# Patient Record
Sex: MALE | Race: WHITE | NOT HISPANIC OR LATINO | Employment: OTHER | URBAN - METROPOLITAN AREA
[De-identification: names, ages, dates, MRNs, and addresses within clinical notes are randomized per-mention and may not be internally consistent; named-entity substitution may affect disease eponyms.]

---

## 2018-11-06 ENCOUNTER — TELEPHONE (OUTPATIENT)
Dept: UROLOGY | Facility: CLINIC | Age: 68
End: 2018-11-06

## 2018-11-06 NOTE — TELEPHONE ENCOUNTER
----- Message from Darryl Diaz sent at 11/6/2018  8:52 AM CST -----  Contact: pt: [phone]  Needs Advice    Reason for call: pt would like to speak with someone re penile implant, pt stated his implant isn't pumping up        Communication Preference: pt: [phone]

## 2018-11-06 NOTE — TELEPHONE ENCOUNTER
Spoke to pt. Pt reports problems with penile implant, malfunction. Instructed pt he needs appt. appt scheduled. Pt confirmed date/time/location.

## 2018-11-14 ENCOUNTER — HOSPITAL ENCOUNTER (OUTPATIENT)
Dept: RADIOLOGY | Facility: HOSPITAL | Age: 68
Discharge: HOME | End: 2018-11-14
Attending: UROLOGY
Payer: MEDICARE

## 2018-11-14 ENCOUNTER — OFFICE VISIT (OUTPATIENT)
Dept: UROLOGY | Facility: CLINIC | Age: 68
End: 2018-11-14
Payer: MEDICARE

## 2018-11-14 VITALS
DIASTOLIC BLOOD PRESSURE: 97 MMHG | BODY MASS INDEX: 31.05 KG/M2 | HEIGHT: 72 IN | SYSTOLIC BLOOD PRESSURE: 162 MMHG | HEART RATE: 81 BPM | WEIGHT: 229.25 LBS

## 2018-11-14 DIAGNOSIS — T83.490A MALFUNCTION OF PENILE PROSTHESIS, INITIAL ENCOUNTER: ICD-10-CM

## 2018-11-14 DIAGNOSIS — I10 HYPERTENSION, UNSPECIFIED TYPE: ICD-10-CM

## 2018-11-14 DIAGNOSIS — C61 PROSTATE CANCER: ICD-10-CM

## 2018-11-14 DIAGNOSIS — E78.00 HIGH CHOLESTEROL: ICD-10-CM

## 2018-11-14 DIAGNOSIS — N52.31 ERECTILE DYSFUNCTION AFTER RADICAL PROSTATECTOMY: Primary | ICD-10-CM

## 2018-11-14 PROCEDURE — 72192 CT PELVIS W/O DYE: CPT | Mod: TC

## 2018-11-14 PROCEDURE — 72192 CT PELVIS W/O DYE: CPT | Mod: 26,,, | Performed by: RADIOLOGY

## 2018-11-14 PROCEDURE — 99213 OFFICE O/P EST LOW 20 MIN: CPT | Mod: PBBFAC | Performed by: UROLOGY

## 2018-11-14 PROCEDURE — 99204 OFFICE O/P NEW MOD 45 MIN: CPT | Mod: S$GLB,,, | Performed by: UROLOGY

## 2018-11-14 PROCEDURE — 99999 PR PBB SHADOW E&M-EST. PATIENT-LVL III: CPT | Mod: PBBFAC,,, | Performed by: UROLOGY

## 2018-11-14 RX ORDER — TIZANIDINE 2 MG/1
TABLET ORAL
COMMUNITY
Start: 2018-11-06

## 2018-11-14 RX ORDER — ASPIRIN 325 MG
325 TABLET ORAL DAILY
COMMUNITY

## 2018-11-14 RX ORDER — LORATADINE 10 MG/1
10 TABLET ORAL DAILY
COMMUNITY

## 2018-11-14 NOTE — PROGRESS NOTES
CHIEF COMPLAINT:    Mr. Parmar is a 68 y.o. male presenting with ED.    PRESENTING ILLNESS:    Galileo Parmar is a 68 y.o. male male who c/o ED.  He is s/p a RALP by Dr. Valentine in 2009.  His cancer was found after a TURP.  He underwent placement of a 3 piece AMS IPP on 6/4/13.      He comes in today stating that he can't inflate the device anymore.  It was working well, but it stopped working ~ 1 weeks ago.    No bothersome LUTS.  No hematuria. No dysuria.    REVIEW OF SYSTEMS:    Galileo Parmar denies headache, blurred vision, fever, nausea, vomiting, chills, abdominal pain, bleeding per rectum, cough, SOB, recent loss of consciousness, recent mental status changes, seizures, dizziness, or upper or lower extremity weakness.    ALESHA  1. 1  2. 0  3. 0  4. 0  5. 0      PATIENT HISTORY:    Past Medical History:   Diagnosis Date    Allergy     Elevated PSA     Hypertension     Prostate cancer     Skin cancer     Urinary tract infection        Past Surgical History:   Procedure Laterality Date    CYSTOSCOPY      HERNIA REPAIR      double    INSERTION, PENILE PROSTHESIS, INFLATABLE N/A 6/4/2013    Performed by Soy Funez MD at [facility] OR [address] [place] [address]    PROSTATE SURGERY      2009 with turp x2    Robotic prostatectomy      SKIN CANCER EXCISION      TONSILLECTOMY, ADENOIDECTOMY      VASECTOMY         Family History   Problem Relation Age of Onset    Prostate cancer Father     Cancer Mother     Cancer Sister        Social History     Socioeconomic History    Marital status:      Spouse name: Not on file    Number of children: Not on file    Years of education: Not on file    Highest education level: Not on file   Social Needs    Financial resource strain: Not on file    Food insecurity - worry: Not on file    Food insecurity - inability: Not on file    Transportation needs - medical: Not on file    Transportation needs - non-medical: Not on file   Occupational History    Not on  file   Tobacco Use    Smoking status: Former Smoker     Packs/day: 1.00     Years: 10.00     Pack years: 10.00     Types: Cigarettes    Smokeless tobacco: Never Used   Substance and Sexual Activity    Alcohol use: Yes     Comment: 28 oz week    Drug use: No    Sexual activity: Yes     Partners: Female   Other Topics Concern    Not on file   Social History Narrative    Not on file       Allergies:  Sulfa (sulfonamide antibiotics)    Medications:    Current Outpatient Medications:     aspirin 325 MG tablet, Take 325 mg by mouth once daily., Disp: , Rfl:     buPROPion (WELLBUTRIN XL) 300 MG 24 hr tablet, , Disp: , Rfl:     desonide (DESOWEN) 0.05 % cream, , Disp: , Rfl:     ezetimibe (ZETIA) 10 mg tablet, Take 10 mg by mouth once daily., Disp: , Rfl:     lisinopril 10 MG tablet, , Disp: , Rfl:     loratadine (CLARITIN) 10 mg tablet, Take 10 mg by mouth once daily., Disp: , Rfl:     multivitamin capsule, Take 1 capsule by mouth once daily., Disp: , Rfl:     polyethylene glycol (GLYCOLAX) 17 gram PwPk, Take 17 g by mouth once daily., Disp: 30 packet, Rfl: 0    tiZANidine (ZANAFLEX) 2 MG tablet, , Disp: , Rfl:     trazodone (DESYREL) 150 MG tablet, nightly. , Disp: , Rfl:     PHYSICAL EXAMINATION:    The patient generally appears in good health, is appropriately interactive, and is in no apparent distress.     Eyes: anicteric sclerae, moist conjunctivae; no lid-lag; PERRLA     HENT: Atraumatic; oropharynx clear with moist mucous membranes and no mucosal ulcerations;normal hard and soft palate.  No evidence of lymphadenopathy.    Neck: Trachea midline.  No thyromegaly.    Musculoskeletal: No abnormal gait.    Skin: No lesions.    Mental: Cooperative with normal affect.  Is oriented to time, place, and person.    Neuro: Grossly intact.    Chest: Normal inspiratory effort.   No accessory muscles.  No audible wheezes.  Respirations symmetric on inspiration and expiration.    Heart: Regular rhythm.       Abdomen:  Soft, non-tender. No masses or organomegaly. Bladder is not palpable. No evidence of flank discomfort. No evidence of inguinal hernia.    Genitourinary: The penis is circumcised with no evidence of plaques or induration. The urethral meatus is normal. The testes, epididymides, and cord structures are normal in size and contour bilaterally. The scrotum is normal in size and contour.  The IPP components are palpable in the penis and scrotum.    Extremities: No clubbing, cyanosis, or edema      LABS:    UA dipped negative today  No results found for: PSA, PSADIAG, PSATOTAL, PSAFREE, PSAFREEPCT    IMPRESSION:    Encounter Diagnoses   Name Primary?    Erectile dysfunction after radical prostatectomy Yes    Prostate cancer     High cholesterol     Hypertension, unspecified type    HTN, controlled  Hyperlipidemia, controlled      PLAN:    1. Cannot inflate/deflate the device today.  It is c/w a leak.  Recommend replacement.  2. Will get a CT to define the anatomy of the reservoir.  Discussed we may not remove this.  3. Patient read the IPP handout and understands the risks associated with this procedure. He knows the risk of infection as well as surgery requirements if it were to become infected. He understands the impact on spontaneous and nocturnal erections, as well as need for replacement and repair, which was clearly outlined in verbal and written form. He was given the chance to ask questions and alternatives were discussed.  4. Discussed he's at higher risk of infection due to revision surgery.  5. Risks discussed were You have elected to undergo placement of a penile prosthesis. Several devices are currently available in the marketplace, including those which are non-inflatable, versus two- or three-piece inflatable prostheses. All of these devices have the capacity to give you the opportunity to have a rigid penis on demand and to be used as frequently and as long as you would like. There are,  therefore, many advantages to the use of the prosthesis which you have already discussed with your physician. The goal of this informed consent form is to identify the potential problems that have been recognized to occur with penile prosthesis placement and that you understand the risks of undergoing prosthetic placement. It is important to recognize that as a result of improvements in design as well as better surgical technique, that all of the risks listed below are less than they were even 10 years ago. It is also important to recognize that most of the available studies report on historical data for devices which are now either not manufactured or have undergone structural improvements to reduce those side effects. The complications are simply noted in random order and do not reflect their frequency.    1. Prosthesis mechanical failure occurs as a result of leakage of fluid from the inflatable types of devices. This typically occurs as a result of a fracture in the tubing, most commonly as it emerges out of the scrotal pump, but it can also be due to a leak from the erectile cylinders in the penis. It is felt that this occurs as a result of repetitive mechanical trauma, which weakens the tubing and causes it to crack. When the fluid leaks, it is not something you would be aware of. It does not cause pain. The fluid contained within the device is typically a sterile saline solution that will simply be reabsorbed by the body. What you will recognize is that when you squeeze the pump, there will be no transfer of fluid and no rigidity or inadequate rigidity. The most recent long-term data looking at 5-10 year success reports mechanical failure in the 5-10% range over that period of time. Looked at another way, 90-95% of men will have a functional prosthesis in 10 years. These devices are designed to be used for life. Each company has its own warrantee which you should discuss with your physician.    2. Infection  "is a disastrous complication which usually requires the removal of the prosthesis, as it is rare to be able to clear infection so long as the prosthesis is within the body. Occasionally, the infected prosthesis can be removed, the location of the device can be washed out vigorously with a special antibiotic salvage procedure and then a new device may be able to be immediately placed. When this is not possible, the infected device is removed and then a period of healing will follow where the device can be replaced after a period of healing (6 weeks to 6 months). Delayed replacement of a prosthesis after initial removal is a more complicated operation associated with the potential for not being able to replace the device because of scarring but other problems such as shortening of the penis or change in sensation and shape may also occur.    As a result of improved surgical technique and device design, infection rates are now markedly reduced, typically being reported in the <1-2% range for new prosthesis placements and up to 3% when the prosthesis is uninfected and has mechanically failed.    3. Bleeding and hematoma are rarely a problem. There is likely to be localized swelling as well as "black and blue" of the penis, groin area, and scrotal sack. These will resolve without treatment over 1-3 weeks. Rarely a scrotal hematoma (collection of blood) will develop which can be treated with rest; it will reabsorb with time or, if it is growing in size or painful, it may need to be evacuated surgically (opened up and washed out). The risk of needing a blood transfusion after penile prosthesis placement is extremely rare to nonexistent.    4. Post-operative pain is variable and can be minimal, but in most men it is quite significant. Your physician will provide you with adequate pain medication to help control the pain, but not necessarily eliminate it entirely. As the prosthesis heals, there will be complete resolution of " the pain, such that you will typically not even be aware that the prosthesis is within your body unless you were to touch it directly. Complete pain resolution will most commonly occur within 4-12 weeks postoperatively. Post-operative pain is typically managed with oral narcotic agents. You should be aware that these drugs can cause constipation as well as sleepiness; therefore, you should not be in any position where you might need to make important decision or driving until the pain is under better control without the need for narcotic pain killers. It is recommended that during the first several days after the operation, that you spend as little time as possible on your feet, as this will encourage healing, reduce swelling, and result in more rapid resolution of pain.    5. Loss of length -  Penile shortening is probably the reason that most men are disappointed with the outcome from their prosthesis surgery. Studies have shown that when the flaccid penile stretched length is measured preoperatively, that the actual loss of length following placement of the prosthesis is on average no more than one centimeter (1/3 inch). To reduce the likelihood of loss of length, the surgeon will do his best to place the proper size device that fits your penis. You can expect that the length of your penis will be much like what you see when you grab the head of the penis and pull it straight out away from your body. Many men who believe they have lost length in their penis following prosthesis surgery in fact did not really lose length because of the surgery, but rather because they have not had had a full erection for some time during which there may have been some loss of tissue elasticity and thereby shortening of the penis. In addition, they may have gained some weight in the pubic area which will cover the penis and make it look shorter. Lastly, they may be expecting that the postoperative result will be like the erections  they had when they were a much younger man. Although the goal is to make the penis as long as possible, one can expect that the rigidity of the penis will be much like the erections obtained as a younger man.    6. Decreased girth - Girth is typically not substantially changed as most cylinders can expand and fill the penis satisfactorily, but if there has been scarring within the tissues of the penis, this can prevent expansion and result in a narrower appearing penis. The surgeon will do his best to put the largest cylinder in the penis, but there are limitations to which the tissues can expand. Decreased girth is not a common complaint.    7. Sensory loss - By and large the surgical techniques being used to avoid injury to the sensory nerves of the penis. Therefore, there is rarely any significant change in the sexual sensation of the penis. Some men find that it takes longer for them to experience orgasm. This may occur because they can simply inflate the penis without any sexuall arousal, and then it will seem to take longer for them to become aroused, resulting in the prolonged time to orgasm. Therefore, proper sexual stimulation is important to enjoy the entire sexual experience with a prosthesis.    8. Change in shape - The overall shape or configuration of the penis is rarely altered as a result of placement of any type of prosthesis, but if there is some unidentified scarring involving the penis such as that which occurs with Peyronie's disease, some curvature or indentations including hourglass narrowing can be identified along the shaft. Typically, in the postoperative period, the prosthesis will cause an internal tissue expansion which will correct these deformities. This may take 6-9 months occur.    9. Erosion or cylinder extrusion - If the tissues in the tips of the penis are weakened either by previous internal penile disease or as a result of the surgery, the prosthetic cylinder may migrate  "distally into the head of the penis and may appear to be ready to poke through the skin or the urethra. By all means, if such an irregularity is seen, one should address it with your doctor before the prosthesis is exposed so that it can be corrected without developing infection. This problem occurs in <1% of cases.    10. Pump problems - These include difficulty in activating or deactivating the pump as well as change of pump location due to migration or fixation of the pump to the scrotal skin. These problems are also quite unusual but can happen as a result of an altered healing process or a malposition of the pump by the implanting surgeon. If the pump were to migrate or become fixed or difficult to manipulate because of its position, a simple outpatient scrotal procedure can be performed to reposition the pump which is almost universally successful. This procedure is performed in no more than 1% of cases.    Prosthesis care - In the postoperative period, it is best to take the antibiotics prescribed by your physician, reduce your physical activity to reduce swelling and enhance healing, take pain medicine for your comfort, and avoid submergingthe incision during bathing for a minimum of 1-4 weeks. Specific bathing instructions will be issued by your physician. It is not uncommon to have an inflatable prosthesis partially fill during the postoperative period involuntarily. This is called "auto-inflation." To prevent this problem, it is important that once the prosthesis is activated, which is typically 4-6 weeks after surgery, that you perform what is known as "cycling" of the device. Cycling means complete inflation and then complete deflation of the penile cylinders twice per day for one month. In doing this, the tissues around the prosthesis are softened and stretched allowing complete deflation of the device into the reservoir. It also will allow you to become more familiar with operating the device and make " it easier for you to activate it quickly and inconspicuously when you want to engage in sexual relations. If you have an inflatable device with a scrotal pump, it is best to inflate it without twisting it. The repetitive twisting of the pump can weaken the connections between the tubing and the pump and is the most common cause for mechanical failure of the prosthesis.    It is hoped that this review will inform you of the potential problems associated with your prosthesis and as a result, will make for more realistic expectations regarding the outcome.     Copy to:

## 2018-12-03 ENCOUNTER — TELEPHONE (OUTPATIENT)
Dept: UROLOGY | Facility: CLINIC | Age: 68
End: 2018-12-03

## 2018-12-03 NOTE — TELEPHONE ENCOUNTER
Spoke to the pt to postpone his pre op and procedure  Because of the auth still pending as of 12-3-18. The patient did verbally understands.

## 2018-12-04 ENCOUNTER — TELEPHONE (OUTPATIENT)
Dept: UROLOGY | Facility: CLINIC | Age: 68
End: 2018-12-04

## 2018-12-04 ENCOUNTER — ANESTHESIA EVENT (OUTPATIENT)
Dept: SURGERY | Facility: HOSPITAL | Age: 68
End: 2018-12-04
Payer: MEDICARE

## 2018-12-04 DIAGNOSIS — N52.31 ERECTILE DYSFUNCTION AFTER RADICAL PROSTATECTOMY: Primary | ICD-10-CM

## 2018-12-04 DIAGNOSIS — Z01.818 PREOPERATIVE TESTING: Primary | ICD-10-CM

## 2018-12-04 NOTE — ANESTHESIA PREPROCEDURE EVALUATION
Odilia Gutierrez, RN   Registered Nurse      Pre Admission Screening   Signed                     Anesthesia Assessment: Preoperative EQUATION     Planned Procedure: Procedure(s) (LRB):  REMOVAL, PENILE PROSTHESIS, INFLATABLE (N/A)  REPLACEMENT, PENILE PROSTHESIS, INFLATABLE (N/A)  Requested Anesthesia Type:General  Surgeon: Soy Funez MD  Service: Urology  Known or anticipated Date of Surgery:12/11/2018     Surgeon notes: reviewed     Electronic QUestionnaire Assessment completed via nurse interview with patient.      NO AQ     Triage considerations:      The patient has no apparent active cardiac condition (No unstable coronary Syndrome such as severe unstable angina or recent [<1 month] myocardial infarction, decompensated CHF, severe valvular   disease or significant arrhythmia)     Previous anesthesia records:GETA and No problems   6/4/2013  Penile Prosthesis insertion     Airway/Jaw/Neck:  Airway Findings: Tongue: Normal Mallampati: I  TM Distance: <4 cm         Last PCP note: within 1 month , outside [facility]  Dr. Galileo Khan, [place]  Subspecialty notes: Neurology, Urology     Other important co-morbidities: Hx Prostate Ca, HTN, Limb-Girdle Muscular Dystrophy     Tests already available:  No recent tests. Requesting records from out of state MD's                            Instructions given. (See in Nurse's note)     Optimization:  Anesthesia Preop Clinic Assessment  Indicated: Not required for this procedure                Plan:    Testing:  Hematology Profile, BMP and EKG                          Patient  has previously scheduled Medical Appointment:  12/5/2018     Navigation:    Tests Scheduled.                          Results will be tracked by Preop Clinic.          12/6/2018  Lab and EKG results from Dr. Khan received.  Instructions for ASA to be held 1 week prior to surgery received from Dr. Pritchett.  (records scanned to media)                                                                                                                12/04/2018  Galileo Parmar is a 68 y.o., male.    Anesthesia Evaluation    I have reviewed the Patient Summary Reports.     I have reviewed the Medications.     Review of Systems  Anesthesia Hx:  No problems with previous Anesthesia Denies Hx of Anesthetic complications  History of prior surgery of interest to airway management or planning: Previous anesthesia: General 6/4/2013  Insertion of Penile prosthesis with general anesthesia.  Procedure performed at an [facility] Facility. Denies Family Hx of Anesthesia complications.   Denies Personal Hx of Anesthesia complications.   Social:  Former Smoker, Social Alcohol Use  Tobacco Use:  (quit over 40 yrs ago)   Hematology/Oncology:  Hematology Normal       -- Cancer in past history: s/p surgery   Oncology Comments: Hx of Prostate CA (Prostatectomy 2009)     EENT/Dental:  EENT/Dental Normal Denies Active Dental Problems  Denies Jaw Problems   Cardiovascular:   Hypertension Denies Dysrhythmias.   Denies Angina.  Functional Capacity good / => 4 METS, plays golf and is very active  Hypertension , Well Controlled on Rx , Recent typical home B/P of 130/80   Pulmonary:  Pulmonary Normal  Denies Asthma.  Denies Shortness of breath.  Denies Recent URI.    Renal/:  Other Renal / Gu Conditions: (Erectile dysfunction after radical prostatectomy)   Hepatic/GI:  Hepatic/GI Normal    Musculoskeletal:   Arthritis     Neurological:  Neurology Normal  Neuromuscular Disease, Muscular Dystrophy, Limb-Girdle Muscular Dystrophy   Psych:  Psychiatric Normal           Physical Exam  General:  Well nourished, Obesity    Airway/Jaw/Neck:  Airway Findings: Mouth Opening: Normal Tongue: Normal  General Airway Assessment: Adult  Mallampati: II  TM Distance: Normal, at least 6 cm  Jaw/Neck Findings:  Neck ROM: Normal ROM     Eyes/Ears/Nose:  EYES/EARS/NOSE FINDINGS: Normal   Dental:  Dental Findings: In tact, molar caps   Chest/Lungs:  Chest/Lungs  Findings: Normal Respiratory Rate     Heart/Vascular:  Heart Findings: Rate: Normal  Rhythm: Regular Rhythm     Abdomen:  Abdomen Findings: Normal    Musculoskeletal:  Musculoskeletal Findings: Normal   Skin:  Skin Findings: Normal    Mental Status:  Mental Status Findings:  Cooperative, Alert and Oriented         Anesthesia Plan  Type of Anesthesia, risks & benefits discussed:  Anesthesia Type:  general  Patient's Preference: General  Intra-op Monitoring Plan: standard ASA monitors  Intra-op Monitoring Plan Comments:   Post Op Pain Control Plan: per primary service following discharge from PACU and IV/PO Opioids PRN  Post Op Pain Control Plan Comments:   Induction:   IV  Beta Blocker:  Patient is not currently on a Beta-Blocker (No further documentation required).       Informed Consent: Patient understands risks and agrees with Anesthesia plan.  Questions answered. Anesthesia consent signed with patient.  ASA Score: 2     Day of Surgery Review of History & Physical:    H&P update referred to the surgeon.         Ready For Surgery From Anesthesia Perspective.

## 2018-12-04 NOTE — PRE ADMISSION SCREENING
Anesthesia Assessment: Preoperative EQUATION    Planned Procedure: Procedure(s) (LRB):  REMOVAL, PENILE PROSTHESIS, INFLATABLE (N/A)  REPLACEMENT, PENILE PROSTHESIS, INFLATABLE (N/A)  Requested Anesthesia Type:General  Surgeon: Soy Funez MD  Service: Urology  Known or anticipated Date of Surgery:12/11/2018    Surgeon notes: reviewed    Electronic QUestionnaire Assessment completed via nurse interview with patient.      NO AQ    Triage considerations:     The patient has no apparent active cardiac condition (No unstable coronary Syndrome such as severe unstable angina or recent [<1 month] myocardial infarction, decompensated CHF, severe valvular   disease or significant arrhythmia)    Previous anesthesia records:GETA and No problems   6/4/2013  Penile Prosthesis insertion    Airway/Jaw/Neck:  Airway Findings: Tongue: Normal Mallampati: I  TM Distance: <4 cm        Last PCP note: within 1 month , outside [facility]  Dr. Galileo Khan, [place]  Subspecialty notes: Neurology, Urology    Other important co-morbidities: Hx Prostate Ca, HTN, Limb-Girdle Muscular Dystrophy     Tests already available:  No recent tests. Requesting records from out of state MD's            Instructions given. (See in Nurse's note)    Optimization:  Anesthesia Preop Clinic Assessment  Indicated: Not required for this procedure        Plan:    Testing:  Hematology Profile, BMP and EKG    Patient  has previously scheduled Medical Appointment:  12/5/2018    Navigation:  Tests Scheduled.    Results will be tracked by Preop Clinic.

## 2018-12-05 ENCOUNTER — OFFICE VISIT (OUTPATIENT)
Dept: UROLOGY | Facility: CLINIC | Age: 68
End: 2018-12-05
Payer: MEDICARE

## 2018-12-05 ENCOUNTER — HOSPITAL ENCOUNTER (OUTPATIENT)
Dept: CARDIOLOGY | Facility: CLINIC | Age: 68
Discharge: HOME | End: 2018-12-05
Payer: MEDICARE

## 2018-12-05 DIAGNOSIS — N52.9 ERECTILE DYSFUNCTION, UNSPECIFIED ERECTILE DYSFUNCTION TYPE: Primary | ICD-10-CM

## 2018-12-05 DIAGNOSIS — Z01.818 PREOPERATIVE TESTING: ICD-10-CM

## 2018-12-05 PROCEDURE — 93010 ELECTROCARDIOGRAM REPORT: CPT | Mod: S$GLB,,, | Performed by: INTERNAL MEDICINE

## 2018-12-05 PROCEDURE — 99499 UNLISTED E&M SERVICE: CPT | Mod: S$GLB,,, | Performed by: UROLOGY

## 2018-12-05 PROCEDURE — 93005 ELECTROCARDIOGRAM TRACING: CPT | Mod: S$GLB,,, | Performed by: ANESTHESIOLOGY

## 2018-12-05 NOTE — PROGRESS NOTES
Urology ([facility]) H&P  Staff: Dr. Soy Funez MD    Is this patient in a research study?  Yes    CC: non functional IPP    HPI:  Galileo Parmar is a 68 y.o. male with a non functioning IPP.  It stopped working in the past few weeks.  Previously it worked well.  He had a prostatectomy with Dr. Valentine in 2009.   We do not have any PSA or pathology in our system.    He had post op ED treated with an IPP.  This was placed in 2013.  It was a 21 cm device with 1 cm rear tip extender bilaterally.  [place] is in RLQ.  He has also had bilateral inguinal hernia repairs.  Unknown if mesh was used.    He is mostly continent.   He does have small volume leakage but isn't bothered.  Doesn't wear pads.  Most recent PSA is undetectable per patient report.     Pinch strength = 10.   strength 70.     ROS:  Neg except per HPI    Past Medical History:   Diagnosis Date    Allergy     Elevated PSA     Hypertension     Prostate cancer     Skin cancer     Urinary tract infection        Past Surgical History:   Procedure Laterality Date    CYSTOSCOPY      HERNIA REPAIR      double    INSERTION, PENILE PROSTHESIS, INFLATABLE N/A 6/4/2013    Performed by Soy Funez MD at [facility] OR [address] [place] [address]    PROSTATE SURGERY      2009 with turp x2    Robotic prostatectomy      SKIN CANCER EXCISION      TONSILLECTOMY, ADENOIDECTOMY      VASECTOMY     Bilateral inguinal hernia repair, doesn't know if he had mesh placed    Social History     Socioeconomic History    Marital status:      Spouse name: Not on file    Number of children: Not on file    Years of education: Not on file    Highest education level: Not on file   Social Needs    Financial resource strain: Not on file    Food insecurity - worry: Not on file    Food insecurity - inability: Not on file    Transportation needs - medical: Not on file    Transportation needs - non-medical: Not on file   Occupational History    Not on file   Tobacco Use     Smoking status: Former Smoker     Packs/day: 1.00     Years: 10.00     Pack years: 10.00     Types: Cigarettes    Smokeless tobacco: Never Used   Substance and Sexual Activity    Alcohol use: Yes     Comment: 28 oz week    Drug use: No    Sexual activity: Yes     Partners: Female   Other Topics Concern    Not on file   Social History Narrative    Not on file       Family History   Problem Relation Age of Onset    Prostate cancer Father     Cancer Mother     Cancer Sister        Review of patient's allergies indicates:   Allergen Reactions    Sulfa (sulfonamide antibiotics) Rash       Current Outpatient Medications on File Prior to Visit   Medication Sig Dispense Refill    aspirin 325 MG tablet Take 325 mg by mouth once daily.      buPROPion (WELLBUTRIN XL) 300 MG 24 hr tablet       ezetimibe (ZETIA) 10 mg tablet Take 10 mg by mouth once daily.      lisinopril 10 MG tablet       loratadine (CLARITIN) 10 mg tablet Take 10 mg by mouth once daily.      multivitamin capsule Take 1 capsule by mouth once daily.      tiZANidine (ZANAFLEX) 2 MG tablet       trazodone (DESYREL) 150 MG tablet nightly.        No current facility-administered medications on file prior to visit.        Anticoagulation:  Yes,  mg will hold one week prior    Physical Exam:    AAOx4, NAD, WDWN  NC/AT, EOMI, PER, sclerae anicteric, speech normal, tongue midline  Nl effort, CTAB  RRR  Soft, non-tender, non-distended  Circumcised  Prosthesis components palpable and in normal position    Labs:    Urine dipstick today - neg for all components    Lab Results   Component Value Date    WBC 6.23 12/05/2018    HGB 14.5 12/05/2018    HCT 45.1 12/05/2018    MCV 94 12/05/2018     12/05/2018       BMP  Lab Results   Component Value Date     05/29/2013    K 4.3 05/29/2013     05/29/2013    CO2 25 05/29/2013    BUN 17 05/29/2013    CREATININE 0.8 05/29/2013    CALCIUM 9.4 05/29/2013    ANIONGAP 11.0 05/29/2013     ESTGFRAFRICA >60 05/29/2013    EGFRNONAA >60 05/29/2013       No results found for: PSA    Imaging:  CT reviewed: reservoir is in RLQ, other prosthesis components in appropriate position    Assessment: Galileo Parmar is a 68 y.o. male with a non functioning IPP    Plan:     1. To OR on 12/11/2018 for IPP explant with reimplant  2. Consents signed   3. I have explained the risk, benefits, and alternatives of the procedure in detail. The patient voices understanding and all questions have been answered. The patient agrees to proceed as planned.   4. The risks and benefits of participating in our clinical trial have been discussed and the patient has consented for the research study here at [facility].     Meredith Nova MD

## 2018-12-05 NOTE — H&P (VIEW-ONLY)
Urology ([facility]) H&P  Staff: Dr. Soy Funez MD    Is this patient in a research study?  Yes    CC: non functional IPP    HPI:  Galileo Parmar is a 68 y.o. male with a non functioning IPP.  It stopped working in the past few weeks.  Previously it worked well.  He had a prostatectomy with Dr. Valentine in 2009.   We do not have any PSA or pathology in our system.    He had post op ED treated with an IPP.  This was placed in 2013.  It was a 21 cm device with 1 cm rear tip extender bilaterally.  [place] is in RLQ.  He has also had bilateral inguinal hernia repairs.  Unknown if mesh was used.    He is mostly continent.   He does have small volume leakage but isn't bothered.  Doesn't wear pads.  Most recent PSA is undetectable per patient report.     Pinch strength = 10.   strength 70.     ROS:  Neg except per HPI    Past Medical History:   Diagnosis Date    Allergy     Elevated PSA     Hypertension     Prostate cancer     Skin cancer     Urinary tract infection        Past Surgical History:   Procedure Laterality Date    CYSTOSCOPY      HERNIA REPAIR      double    INSERTION, PENILE PROSTHESIS, INFLATABLE N/A 6/4/2013    Performed by Soy Funez MD at [facility] OR [address] [place] [address]    PROSTATE SURGERY      2009 with turp x2    Robotic prostatectomy      SKIN CANCER EXCISION      TONSILLECTOMY, ADENOIDECTOMY      VASECTOMY     Bilateral inguinal hernia repair, doesn't know if he had mesh placed    Social History     Socioeconomic History    Marital status:      Spouse name: Not on file    Number of children: Not on file    Years of education: Not on file    Highest education level: Not on file   Social Needs    Financial resource strain: Not on file    Food insecurity - worry: Not on file    Food insecurity - inability: Not on file    Transportation needs - medical: Not on file    Transportation needs - non-medical: Not on file   Occupational History    Not on file   Tobacco Use     Smoking status: Former Smoker     Packs/day: 1.00     Years: 10.00     Pack years: 10.00     Types: Cigarettes    Smokeless tobacco: Never Used   Substance and Sexual Activity    Alcohol use: Yes     Comment: 28 oz week    Drug use: No    Sexual activity: Yes     Partners: Female   Other Topics Concern    Not on file   Social History Narrative    Not on file       Family History   Problem Relation Age of Onset    Prostate cancer Father     Cancer Mother     Cancer Sister        Review of patient's allergies indicates:   Allergen Reactions    Sulfa (sulfonamide antibiotics) Rash       Current Outpatient Medications on File Prior to Visit   Medication Sig Dispense Refill    aspirin 325 MG tablet Take 325 mg by mouth once daily.      buPROPion (WELLBUTRIN XL) 300 MG 24 hr tablet       ezetimibe (ZETIA) 10 mg tablet Take 10 mg by mouth once daily.      lisinopril 10 MG tablet       loratadine (CLARITIN) 10 mg tablet Take 10 mg by mouth once daily.      multivitamin capsule Take 1 capsule by mouth once daily.      tiZANidine (ZANAFLEX) 2 MG tablet       trazodone (DESYREL) 150 MG tablet nightly.        No current facility-administered medications on file prior to visit.        Anticoagulation:  Yes,  mg will hold one week prior    Physical Exam:    AAOx4, NAD, WDWN  NC/AT, EOMI, PER, sclerae anicteric, speech normal, tongue midline  Nl effort, CTAB  RRR  Soft, non-tender, non-distended  Circumcised  Prosthesis components palpable and in normal position    Labs:    Urine dipstick today - neg for all components    Lab Results   Component Value Date    WBC 6.23 12/05/2018    HGB 14.5 12/05/2018    HCT 45.1 12/05/2018    MCV 94 12/05/2018     12/05/2018       BMP  Lab Results   Component Value Date     05/29/2013    K 4.3 05/29/2013     05/29/2013    CO2 25 05/29/2013    BUN 17 05/29/2013    CREATININE 0.8 05/29/2013    CALCIUM 9.4 05/29/2013    ANIONGAP 11.0 05/29/2013     ESTGFRAFRICA >60 05/29/2013    EGFRNONAA >60 05/29/2013       No results found for: PSA    Imaging:  CT reviewed: reservoir is in RLQ, other prosthesis components in appropriate position    Assessment: Galileo Parmar is a 68 y.o. male with a non functioning IPP    Plan:     1. To OR on 12/11/2018 for IPP explant with reimplant  2. Consents signed   3. I have explained the risk, benefits, and alternatives of the procedure in detail. The patient voices understanding and all questions have been answered. The patient agrees to proceed as planned.   4. The risks and benefits of participating in our clinical trial have been discussed and the patient has consented for the research study here at [facility].     Meredith Nova MD

## 2018-12-10 ENCOUNTER — TELEPHONE (OUTPATIENT)
Dept: UROLOGY | Facility: CLINIC | Age: 68
End: 2018-12-10

## 2018-12-10 NOTE — TELEPHONE ENCOUNTER
Called pt to confirm arrival time 830am for procedure. Gave pt NPO instructions and gave pt opportunity to ask questions. Pt verbalized understanding.

## 2018-12-11 ENCOUNTER — ANESTHESIA (OUTPATIENT)
Dept: SURGERY | Facility: HOSPITAL | Age: 68
End: 2018-12-11
Payer: MEDICARE

## 2018-12-11 ENCOUNTER — HOSPITAL ENCOUNTER (OUTPATIENT)
Facility: HOSPITAL | Age: 68
Discharge: HOME | End: 2018-12-12
Attending: UROLOGY | Admitting: UROLOGY
Payer: MEDICARE

## 2018-12-11 DIAGNOSIS — N52.9 ERECTILE DYSFUNCTION, UNSPECIFIED ERECTILE DYSFUNCTION TYPE: ICD-10-CM

## 2018-12-11 DIAGNOSIS — C61 PROSTATE CANCER: Primary | ICD-10-CM

## 2018-12-11 DIAGNOSIS — N52.31 ERECTILE DYSFUNCTION AFTER RADICAL PROSTATECTOMY: ICD-10-CM

## 2018-12-11 PROCEDURE — 25000003 PHARM REV CODE 250: Performed by: UROLOGY

## 2018-12-11 PROCEDURE — 94761 N-INVAS EAR/PLS OXIMETRY MLT: CPT

## 2018-12-11 PROCEDURE — 88300 SURGICAL PATH GROSS: CPT | Performed by: PATHOLOGY

## 2018-12-11 PROCEDURE — 63600175 PHARM REV CODE 636 W HCPCS: Performed by: UROLOGY

## 2018-12-11 PROCEDURE — 63600175 PHARM REV CODE 636 W HCPCS: Performed by: NURSE ANESTHETIST, CERTIFIED REGISTERED

## 2018-12-11 PROCEDURE — 71000033 HC RECOVERY, INTIAL HOUR: Performed by: UROLOGY

## 2018-12-11 PROCEDURE — 37000009 HC ANESTHESIA EA ADD 15 MINS: Performed by: UROLOGY

## 2018-12-11 PROCEDURE — 54411 REMOV/REPLC PENIS PROS COMP: CPT | Mod: ,,, | Performed by: UROLOGY

## 2018-12-11 PROCEDURE — D9220A PRA ANESTHESIA: Mod: CRNA,,, | Performed by: NURSE ANESTHETIST, CERTIFIED REGISTERED

## 2018-12-11 PROCEDURE — G0378 HOSPITAL OBSERVATION PER HR: HCPCS

## 2018-12-11 PROCEDURE — 36000706: Performed by: UROLOGY

## 2018-12-11 PROCEDURE — 63600175 PHARM REV CODE 636 W HCPCS

## 2018-12-11 PROCEDURE — 25000003 PHARM REV CODE 250: Performed by: NURSE ANESTHETIST, CERTIFIED REGISTERED

## 2018-12-11 PROCEDURE — D9220A PRA ANESTHESIA: Mod: ANES,,, | Performed by: ANESTHESIOLOGY

## 2018-12-11 PROCEDURE — 88300 SURGICAL PATH GROSS: CPT | Mod: 26,,, | Performed by: PATHOLOGY

## 2018-12-11 PROCEDURE — C1813 PROSTHESIS, PENILE, INFLATAB: HCPCS | Performed by: UROLOGY

## 2018-12-11 PROCEDURE — 71000039 HC RECOVERY, EACH ADD'L HOUR: Performed by: UROLOGY

## 2018-12-11 PROCEDURE — 37000008 HC ANESTHESIA 1ST 15 MINUTES: Performed by: UROLOGY

## 2018-12-11 PROCEDURE — 63600175 PHARM REV CODE 636 W HCPCS: Performed by: ANESTHESIOLOGY

## 2018-12-11 PROCEDURE — 25000003 PHARM REV CODE 250: Performed by: STUDENT IN AN ORGANIZED HEALTH CARE EDUCATION/TRAINING PROGRAM

## 2018-12-11 PROCEDURE — 27000221 HC OXYGEN, UP TO 24 HOURS

## 2018-12-11 PROCEDURE — 36000707: Performed by: UROLOGY

## 2018-12-11 PROCEDURE — 63600175 PHARM REV CODE 636 W HCPCS: Performed by: STUDENT IN AN ORGANIZED HEALTH CARE EDUCATION/TRAINING PROGRAM

## 2018-12-11 PROCEDURE — 27201423 OPTIME MED/SURG SUP & DEVICES STERILE SUPPLY: Performed by: UROLOGY

## 2018-12-11 DEVICE — PROSTHESIS PENILE 700CX 21CM: Type: IMPLANTABLE DEVICE | Site: PENIS | Status: FUNCTIONAL

## 2018-12-11 DEVICE — KIT ACCESSORY PENILE: Type: IMPLANTABLE DEVICE | Site: PENIS | Status: FUNCTIONAL

## 2018-12-11 DEVICE — RESERVOIR FLAT I2 100ML: Type: IMPLANTABLE DEVICE | Site: PENIS | Status: FUNCTIONAL

## 2018-12-11 RX ORDER — HYDROMORPHONE HYDROCHLORIDE 1 MG/ML
INJECTION, SOLUTION INTRAMUSCULAR; INTRAVENOUS; SUBCUTANEOUS
Status: COMPLETED
Start: 2018-12-11 | End: 2018-12-11

## 2018-12-11 RX ORDER — ROCURONIUM BROMIDE 10 MG/ML
INJECTION, SOLUTION INTRAVENOUS
Status: DISCONTINUED | OUTPATIENT
Start: 2018-12-11 | End: 2018-12-11

## 2018-12-11 RX ORDER — NEOSTIGMINE METHYLSULFATE 1 MG/ML
INJECTION, SOLUTION INTRAVENOUS
Status: DISCONTINUED | OUTPATIENT
Start: 2018-12-11 | End: 2018-12-11

## 2018-12-11 RX ORDER — MIDAZOLAM HYDROCHLORIDE 1 MG/ML
INJECTION, SOLUTION INTRAMUSCULAR; INTRAVENOUS
Status: DISCONTINUED | OUTPATIENT
Start: 2018-12-11 | End: 2018-12-11

## 2018-12-11 RX ORDER — OXYCODONE HYDROCHLORIDE 10 MG/1
10 TABLET ORAL EVERY 4 HOURS PRN
Status: DISCONTINUED | OUTPATIENT
Start: 2018-12-11 | End: 2018-12-12 | Stop reason: HOSPADM

## 2018-12-11 RX ORDER — OXYCODONE AND ACETAMINOPHEN 5; 325 MG/1; MG/1
1 TABLET ORAL EVERY 4 HOURS PRN
Status: DISCONTINUED | OUTPATIENT
Start: 2018-12-11 | End: 2018-12-12 | Stop reason: HOSPADM

## 2018-12-11 RX ORDER — VANCOMYCIN HCL IN 5 % DEXTROSE 1G/250ML
1000 PLASTIC BAG, INJECTION (ML) INTRAVENOUS
Status: COMPLETED | OUTPATIENT
Start: 2018-12-11 | End: 2018-12-11

## 2018-12-11 RX ORDER — SODIUM CHLORIDE 9 MG/ML
INJECTION, SOLUTION INTRAVENOUS CONTINUOUS PRN
Status: DISCONTINUED | OUTPATIENT
Start: 2018-12-11 | End: 2018-12-11

## 2018-12-11 RX ORDER — HYDROMORPHONE HYDROCHLORIDE 1 MG/ML
0.2 INJECTION, SOLUTION INTRAMUSCULAR; INTRAVENOUS; SUBCUTANEOUS EVERY 5 MIN PRN
Status: COMPLETED | OUTPATIENT
Start: 2018-12-11 | End: 2018-12-11

## 2018-12-11 RX ORDER — DIPHENHYDRAMINE HCL 25 MG
25 CAPSULE ORAL EVERY 6 HOURS PRN
Status: DISCONTINUED | OUTPATIENT
Start: 2018-12-11 | End: 2018-12-12 | Stop reason: HOSPADM

## 2018-12-11 RX ORDER — VANCOMYCIN HCL IN 5 % DEXTROSE 1.5G/250ML
15 PLASTIC BAG, INJECTION (ML) INTRAVENOUS
Status: DISCONTINUED | OUTPATIENT
Start: 2018-12-11 | End: 2018-12-12 | Stop reason: HOSPADM

## 2018-12-11 RX ORDER — VANCOMYCIN HCL IN 5 % DEXTROSE 1.5G/250ML
15 PLASTIC BAG, INJECTION (ML) INTRAVENOUS
Status: DISCONTINUED | OUTPATIENT
Start: 2018-12-11 | End: 2018-12-11

## 2018-12-11 RX ORDER — POLYETHYLENE GLYCOL 3350 17 G/17G
17 POWDER, FOR SOLUTION ORAL DAILY
Status: DISCONTINUED | OUTPATIENT
Start: 2018-12-11 | End: 2018-12-12 | Stop reason: HOSPADM

## 2018-12-11 RX ORDER — BUPROPION HYDROCHLORIDE 150 MG/1
300 TABLET ORAL DAILY
Status: DISCONTINUED | OUTPATIENT
Start: 2018-12-12 | End: 2018-12-12 | Stop reason: HOSPADM

## 2018-12-11 RX ORDER — OXYCODONE AND ACETAMINOPHEN 10; 325 MG/1; MG/1
TABLET ORAL
Status: DISPENSED
Start: 2018-12-11 | End: 2018-12-12

## 2018-12-11 RX ORDER — FENTANYL CITRATE 50 UG/ML
INJECTION, SOLUTION INTRAMUSCULAR; INTRAVENOUS
Status: DISCONTINUED | OUTPATIENT
Start: 2018-12-11 | End: 2018-12-11

## 2018-12-11 RX ORDER — SODIUM CHLORIDE 9 MG/ML
INJECTION, SOLUTION INTRAVENOUS CONTINUOUS
Status: ACTIVE | OUTPATIENT
Start: 2018-12-11 | End: 2018-12-12

## 2018-12-11 RX ORDER — OXYCODONE AND ACETAMINOPHEN 10; 325 MG/1; MG/1
1 TABLET ORAL ONCE AS NEEDED
Status: COMPLETED | OUTPATIENT
Start: 2018-12-11 | End: 2018-12-11

## 2018-12-11 RX ORDER — SODIUM CHLORIDE 9 MG/ML
INJECTION, SOLUTION INTRAVENOUS CONTINUOUS
Status: DISCONTINUED | OUTPATIENT
Start: 2018-12-11 | End: 2018-12-11

## 2018-12-11 RX ORDER — GLYCOPYRROLATE 0.2 MG/ML
INJECTION INTRAMUSCULAR; INTRAVENOUS
Status: DISCONTINUED | OUTPATIENT
Start: 2018-12-11 | End: 2018-12-11

## 2018-12-11 RX ORDER — PANTOPRAZOLE SODIUM 40 MG/1
40 TABLET, DELAYED RELEASE ORAL DAILY
Status: DISCONTINUED | OUTPATIENT
Start: 2018-12-11 | End: 2018-12-12 | Stop reason: HOSPADM

## 2018-12-11 RX ORDER — LIDOCAINE HCL/PF 100 MG/5ML
SYRINGE (ML) INTRAVENOUS
Status: DISCONTINUED | OUTPATIENT
Start: 2018-12-11 | End: 2018-12-11

## 2018-12-11 RX ORDER — ONDANSETRON 2 MG/ML
4 INJECTION INTRAMUSCULAR; INTRAVENOUS EVERY 8 HOURS PRN
Status: DISCONTINUED | OUTPATIENT
Start: 2018-12-11 | End: 2018-12-12 | Stop reason: HOSPADM

## 2018-12-11 RX ORDER — ONDANSETRON 2 MG/ML
INJECTION INTRAMUSCULAR; INTRAVENOUS
Status: DISCONTINUED | OUTPATIENT
Start: 2018-12-11 | End: 2018-12-11

## 2018-12-11 RX ORDER — VANCOMYCIN HCL IN 5 % DEXTROSE 1G/250ML
1000 PLASTIC BAG, INJECTION (ML) INTRAVENOUS
Status: DISCONTINUED | OUTPATIENT
Start: 2018-12-11 | End: 2018-12-11

## 2018-12-11 RX ORDER — OXYCODONE HYDROCHLORIDE 5 MG/1
5 TABLET ORAL EVERY 4 HOURS PRN
Status: DISCONTINUED | OUTPATIENT
Start: 2018-12-11 | End: 2018-12-12 | Stop reason: HOSPADM

## 2018-12-11 RX ORDER — ACETAMINOPHEN 10 MG/ML
INJECTION, SOLUTION INTRAVENOUS
Status: DISCONTINUED | OUTPATIENT
Start: 2018-12-11 | End: 2018-12-11

## 2018-12-11 RX ORDER — HYDROMORPHONE HYDROCHLORIDE 1 MG/ML
1 INJECTION, SOLUTION INTRAMUSCULAR; INTRAVENOUS; SUBCUTANEOUS
Status: DISCONTINUED | OUTPATIENT
Start: 2018-12-11 | End: 2018-12-12 | Stop reason: HOSPADM

## 2018-12-11 RX ORDER — ACETAMINOPHEN 325 MG/1
650 TABLET ORAL EVERY 6 HOURS PRN
Status: DISCONTINUED | OUTPATIENT
Start: 2018-12-11 | End: 2018-12-12 | Stop reason: HOSPADM

## 2018-12-11 RX ORDER — TIZANIDINE 2 MG/1
2 TABLET ORAL NIGHTLY PRN
Status: DISCONTINUED | OUTPATIENT
Start: 2018-12-11 | End: 2018-12-12 | Stop reason: HOSPADM

## 2018-12-11 RX ORDER — PROPOFOL 10 MG/ML
VIAL (ML) INTRAVENOUS
Status: DISCONTINUED | OUTPATIENT
Start: 2018-12-11 | End: 2018-12-11

## 2018-12-11 RX ADMIN — ROCURONIUM BROMIDE 10 MG: 10 INJECTION, SOLUTION INTRAVENOUS at 10:12

## 2018-12-11 RX ADMIN — ONDANSETRON 4 MG: 2 INJECTION INTRAMUSCULAR; INTRAVENOUS at 12:12

## 2018-12-11 RX ADMIN — MIDAZOLAM HYDROCHLORIDE 2 MG: 1 INJECTION, SOLUTION INTRAMUSCULAR; INTRAVENOUS at 10:12

## 2018-12-11 RX ADMIN — SODIUM CHLORIDE: 0.9 INJECTION, SOLUTION INTRAVENOUS at 05:12

## 2018-12-11 RX ADMIN — SODIUM CHLORIDE, SODIUM GLUCONATE, SODIUM ACETATE, POTASSIUM CHLORIDE, MAGNESIUM CHLORIDE, SODIUM PHOSPHATE, DIBASIC, AND POTASSIUM PHOSPHATE: .53; .5; .37; .037; .03; .012; .00082 INJECTION, SOLUTION INTRAVENOUS at 11:12

## 2018-12-11 RX ADMIN — HYDROMORPHONE HYDROCHLORIDE 0.2 MG: 1 INJECTION, SOLUTION INTRAMUSCULAR; INTRAVENOUS; SUBCUTANEOUS at 01:12

## 2018-12-11 RX ADMIN — VANCOMYCIN HYDROCHLORIDE 1500 MG: 1 INJECTION, POWDER, LYOPHILIZED, FOR SOLUTION INTRAVENOUS at 10:12

## 2018-12-11 RX ADMIN — SODIUM CHLORIDE: 0.9 INJECTION, SOLUTION INTRAVENOUS at 10:12

## 2018-12-11 RX ADMIN — ACETAMINOPHEN 1000 MG: 10 INJECTION, SOLUTION INTRAVENOUS at 10:12

## 2018-12-11 RX ADMIN — FENTANYL CITRATE 50 MCG: 50 INJECTION, SOLUTION INTRAMUSCULAR; INTRAVENOUS at 11:12

## 2018-12-11 RX ADMIN — HYDROMORPHONE HYDROCHLORIDE 0.2 MG: 1 INJECTION, SOLUTION INTRAMUSCULAR; INTRAVENOUS; SUBCUTANEOUS at 12:12

## 2018-12-11 RX ADMIN — GENTAMICIN SULFATE 240 MG: 40 INJECTION, SOLUTION INTRAMUSCULAR; INTRAVENOUS at 10:12

## 2018-12-11 RX ADMIN — FENTANYL CITRATE 50 MCG: 50 INJECTION, SOLUTION INTRAMUSCULAR; INTRAVENOUS at 10:12

## 2018-12-11 RX ADMIN — GLYCOPYRROLATE 0.6 MG: 0.2 INJECTION, SOLUTION INTRAMUSCULAR; INTRAVENOUS at 12:12

## 2018-12-11 RX ADMIN — ROCURONIUM BROMIDE 20 MG: 10 INJECTION, SOLUTION INTRAVENOUS at 11:12

## 2018-12-11 RX ADMIN — VANCOMYCIN HYDROCHLORIDE 1500 MG: 10 INJECTION, POWDER, LYOPHILIZED, FOR SOLUTION INTRAVENOUS at 05:12

## 2018-12-11 RX ADMIN — PROPOFOL 150 MG: 10 INJECTION, EMULSION INTRAVENOUS at 10:12

## 2018-12-11 RX ADMIN — NEOSTIGMINE METHYLSULFATE 5 MG: 1 INJECTION INTRAVENOUS at 12:12

## 2018-12-11 RX ADMIN — VANCOMYCIN HYDROCHLORIDE 1000 MG: 1 INJECTION, POWDER, LYOPHILIZED, FOR SOLUTION INTRAVENOUS at 09:12

## 2018-12-11 RX ADMIN — ROCURONIUM BROMIDE 40 MG: 10 INJECTION, SOLUTION INTRAVENOUS at 10:12

## 2018-12-11 RX ADMIN — GENTAMICIN SULFATE 240 MG: 40 INJECTION, SOLUTION INTRAMUSCULAR; INTRAVENOUS at 09:12

## 2018-12-11 RX ADMIN — OXYCODONE HYDROCHLORIDE 5 MG: 5 TABLET ORAL at 09:12

## 2018-12-11 RX ADMIN — OXYCODONE HYDROCHLORIDE AND ACETAMINOPHEN 1 TABLET: 10; 325 TABLET ORAL at 02:12

## 2018-12-11 RX ADMIN — LIDOCAINE HYDROCHLORIDE 80 MG: 20 INJECTION, SOLUTION INTRAVENOUS at 10:12

## 2018-12-11 NOTE — OP NOTE
[facility]   Operative Note     Date: 12/11/2018    Pre-Op Diagnosis: non-functional IPP     Patient Active Problem List    Diagnosis Date Noted    Erectile dysfunction 12/11/2018    Prostate cancer 02/11/2013    ED (erectile dysfunction) 02/11/2013    Skin cancer 02/11/2013    HTN (hypertension) 02/11/2013    High cholesterol 02/11/2013         Post-Op Diagnosis: same     Procedure(s) Performed:   Removal and Replacement of IPP through infected field during the same operative session     Specimen(s): 3-piece IPP (AMS)    Staff Surgeon: Soy Funez MD    Assistant Surgeon: Michael Miramontes MD    Anesthesia: General endotracheal anesthesia     Indications: Galileo Parmar is a 68 y.o. male with HTN and history of prostate cancer s/p RALP in 2009 with h/o ED, s/p IPP placement in 2013.  He had a 21 cm cylinder with 1 cm rear tip extender placed bilaterally.  The reservoir and pump are on the left side.  On CT scan, the reservoir is in close proximity to the iliac vessels.      Findings:   - old reservoir left in place due to proximity to iliac vessels   - remainder of device explanted   - Mala washout performed   - upsized cylinders by 1 cm bilaterally     New IPP measurements:  21 cm cylinder bilaterally  2 cm RTE bilaterally  100 mL reservoir    Estimated Blood Loss: <50cc       Drains: 16 [place] la catheter    Procedure in detail:  After the risks and benefits of the procedure were explained informed consent was obtained. The patient was taken to the operating room and placed under general anesthesia. Pre-operative antibiotics had already been administered. He was placed in a frog-leg position. His genitals were shaved. He was then prepped for 10 minutes with betadine followed by chloraprep. He was then draped in a sterile fashion with ioban placed across all exposed skin leaving room for the penis only. We scrubbed our hands for another 10 minutes.      A 16 Greek la  catheter was placed and the bladder was drained.     A transverse incision was marked along the penoscrotal junction and incised sharply with a  15 blade.  A Gustavo retractor was then placed for exposure. The capsule surrounding the pump was entered first.  There was no purulent drainage seen around the pump however the biofilm was disrupted and the wound was considered contaminated. We then followed the tubing from the pump to the right corpora and make a corporotomy using bovie cautery. We followed the corporotomy down to the tubing and identified the cylinder.  A right angle was placed around the cylinder to bring it out of the corporotomy. The cylinder was then removed with the rear tip extender.  The tubing was amputated and the cylinder was passed off the field.  We repeated this on the contralateral side, removing both cylinders and leaving the pump in place.  During both cylinder explantations, we identified the urethra to ensure it was protected and remained uninjured.  We placed 2-0 vicryl sutures on each side of the corporotomies for a total of 4 stitches, which would allow us to close to corporotomies after new cylinder implantation.      The pump was then removed and all pieces of the IPP were passed off the field.      We then turned our attention towards the reservoir. We followed the tubing above the pubis all the way into the external ring. He had significant scar tissue and good capsule surrounding the tubing and especially the reservoir itself making it difficult to clear off for removal.  We made the decision to leave the reservoir in place due to its proximity to the iliac vessels.  The tubing was cut at the external ring, and retracted into the space of Retzius.     The Mala washout procedure was then performed in the standard fashion (vanc/gent irrigation, betadine, peroxide, pulsavac, peroxide, betadine and then vanc/gent again).  This washout was performed on each of the corpora proximally  and distally, in the left inguinal canal, and in the area of the former pump.  Again, there was no evidence of urethral injury.     The Marco A to measure each corpora.  Total length of each corpora was 23 cm.     A 21 cm IPP with 2 cm rear tip extenders bilaterally was assembled. The Marco A was then advanced into the corpora and the needle fired through the glans.  The cylinder was then placed within the corpora, proximally and distally.  The corpora was closed using the previously placed sutures.  The cylinder was then placed on the contralateral side in the same fashion.  There was no evidence of crossover.      The device was inflated, and there was good glans support with no SST deformity and the penis was straight.     Attention was then turned to the patient's right inguinal region. The space of Retzius was entered just posterior to the patient's pubic bone.  Blunt finger dissection was used to clear space for the reservoir.   A 100 mL reservoir was then advanced and filled to 100 mL. There was no back pressure identified on filling.     A new subdartos pocket was made in the dependent portion of the scrotum. The pump device was then advanced into the dartos pouch and secured with a purse-string 2-0 Vicryl suture. The pump and cylinders and tubing were then connected to the reservoir in the standard fashion.     The device was then inflated again and again, there was good glans support with no SST deformity and the penis was straight.     It should be noted that copious amounts of antibiotic irrigation was used throughout the case.     Dartos was reapproximated in three separate suture lines using 2-0 Vicryl in a running fashion. Skin was then reapproximated using 3-0 chromic in a running horizontal mattress fashion.  Dermabond was applied, followed by a xeroform gauze, then a mummy wrap was performed using coban and kerlix with scrotal fluffs and mesh underwear.     Once done, the patient was awakened from  anesthesia and then transferred to the PACU in stable condition.     Disposition:  The patient will be admitted to the  service overnight for monitoring.  His la catheter will be removed in the AM and he will be discharged with one month of dicloxacillin.      Michael Miramontes MD

## 2018-12-11 NOTE — INTERVAL H&P NOTE
The patient has been examined and the H&P has been reviewed:    I concur with the findings and no changes have occurred since H&P was written.    Anesthesia/Surgery risks, benefits and alternative options discussed and understood by patient/family.          Active Hospital Problems    Diagnosis  POA    Erectile dysfunction [N52.9]  Yes      Resolved Hospital Problems   No resolved problems to display.

## 2018-12-11 NOTE — TRANSFER OF CARE
Anesthesia Transfer of Care Note    Patient: Galileo Parmar    Procedure(s) Performed: Procedure(s) (LRB):  REMOVAL, PENILE PROSTHESIS, INFLATABLE (N/A)  REPLACEMENT, PENILE PROSTHESIS, INFLATABLE (N/A)    Patient location: PACU    Anesthesia Type: general    Transport from OR: Transported from OR on 6-10 L/min O2 by face mask with adequate spontaneous ventilation    Post pain: adequate analgesia    Post assessment: no apparent anesthetic complications    Post vital signs: stable    Level of consciousness: awake, alert and oriented    Nausea/Vomiting: no nausea/vomiting    Complications: none    Transfer of care protocol was followed      Last vitals:   Visit Vitals  BP (!) 176/84 (BP Location: Left arm, Patient Position: Lying)   Pulse 79   Temp 36.6 °C (97.8 °F) (Oral)   Resp 18   Ht 6' (1.829 m)   Wt 103 kg (227 lb)   SpO2 100%   BMI 30.79 kg/m²

## 2018-12-11 NOTE — NURSING TRANSFER
Nursing Transfer Note      12/11/2018     Transfer 553B    Transfer via stretcher    Transfer with IV fluids, la    Transported by transport    Medicines sent: na    Chart send with patient: yes    Notified: wife via messaging system  Patient reassessed at: 12/11 @ 5296

## 2018-12-11 NOTE — ANESTHESIA POSTPROCEDURE EVALUATION
Anesthesia Post Evaluation    Patient: Galileo Parmar    Procedure(s) Performed: Procedure(s) (LRB):  REMOVAL, PENILE PROSTHESIS, INFLATABLE (N/A)  REPLACEMENT, PENILE PROSTHESIS, INFLATABLE (N/A)    Final Anesthesia Type: general  Patient location during evaluation: PACU  Patient participation: Yes- Able to Participate  Level of consciousness: awake and alert and oriented  Post-procedure vital signs: reviewed and stable  Pain management: adequate  Airway patency: patent  PONV status at discharge: No PONV  Anesthetic complications: no      Cardiovascular status: blood pressure returned to baseline and hemodynamically stable  Respiratory status: unassisted and spontaneous ventilation  Hydration status: euvolemic  Follow-up not needed.        Visit Vitals  BP (!) 171/90   Pulse 73   Temp 36.6 °C (97.8 °F) (Temporal)   Resp 17   Ht 6' (1.829 m)   Wt 103 kg (227 lb)   SpO2 100%   BMI 30.79 kg/m²       Pain/Hector Score: Pain Rating Prior to Med Admin: 5 (12/11/2018  2:19 PM)

## 2018-12-12 VITALS
TEMPERATURE: 97 F | BODY MASS INDEX: 30.75 KG/M2 | RESPIRATION RATE: 17 BRPM | WEIGHT: 227 LBS | HEIGHT: 72 IN | SYSTOLIC BLOOD PRESSURE: 166 MMHG | OXYGEN SATURATION: 97 % | DIASTOLIC BLOOD PRESSURE: 85 MMHG | HEART RATE: 76 BPM

## 2018-12-12 PROCEDURE — 25000003 PHARM REV CODE 250: Performed by: STUDENT IN AN ORGANIZED HEALTH CARE EDUCATION/TRAINING PROGRAM

## 2018-12-12 PROCEDURE — G0378 HOSPITAL OBSERVATION PER HR: HCPCS

## 2018-12-12 PROCEDURE — 63600175 PHARM REV CODE 636 W HCPCS: Performed by: STUDENT IN AN ORGANIZED HEALTH CARE EDUCATION/TRAINING PROGRAM

## 2018-12-12 PROCEDURE — 25000003 PHARM REV CODE 250: Performed by: UROLOGY

## 2018-12-12 RX ORDER — OXYCODONE AND ACETAMINOPHEN 5; 325 MG/1; MG/1
1-2 TABLET ORAL EVERY 4 HOURS PRN
Qty: 21 TABLET | Refills: 0 | Status: SHIPPED | OUTPATIENT
Start: 2018-12-12 | End: 2019-01-03

## 2018-12-12 RX ORDER — DICLOXACILLIN SODIUM 250 MG/1
250 CAPSULE ORAL 4 TIMES DAILY
Qty: 120 CAPSULE | Refills: 0 | Status: SHIPPED | OUTPATIENT
Start: 2018-12-12 | End: 2019-01-11

## 2018-12-12 RX ORDER — POLYETHYLENE GLYCOL 3350 17 G/17G
17 POWDER, FOR SOLUTION ORAL DAILY
Qty: 30 PACKET | Refills: 0 | Status: SHIPPED | OUTPATIENT
Start: 2018-12-12

## 2018-12-12 RX ADMIN — BUPROPION HYDROCHLORIDE 300 MG: 150 TABLET, EXTENDED RELEASE ORAL at 08:12

## 2018-12-12 RX ADMIN — VANCOMYCIN HYDROCHLORIDE 1500 MG: 10 INJECTION, POWDER, LYOPHILIZED, FOR SOLUTION INTRAVENOUS at 05:12

## 2018-12-12 RX ADMIN — OXYCODONE HYDROCHLORIDE 5 MG: 5 TABLET ORAL at 04:12

## 2018-12-12 RX ADMIN — GENTAMICIN SULFATE 240 MG: 40 INJECTION, SOLUTION INTRAMUSCULAR; INTRAVENOUS at 05:12

## 2018-12-12 RX ADMIN — POLYETHYLENE GLYCOL 3350 17 G: 17 POWDER, FOR SOLUTION ORAL at 08:12

## 2018-12-12 RX ADMIN — PANTOPRAZOLE SODIUM 40 MG: 40 TABLET, DELAYED RELEASE ORAL at 08:12

## 2018-12-12 RX ADMIN — OXYCODONE HYDROCHLORIDE 10 MG: 10 TABLET ORAL at 10:12

## 2018-12-12 NOTE — NURSING
Patient will be discharging when ride gets her.  Medication education, scripts, aftercare instructions and follow up appointment provided. Patient verbalized understanding.

## 2018-12-12 NOTE — DISCHARGE SUMMARY
[facility]  Urology  Discharge Summary      Patient Name: Galileo Parmar  MRN: [id number]  Admission Date: 12/11/2018  Hospital Length of Stay: 0 days  Discharge Date and Time:  12/12/2018 8:02 AM  Attending Physician: Soy Funez MD   Discharging Provider: Michael Miramontes MD  Primary Care Physician: Primary Doctor Jenifer    HPI: 68 YOM POD 1 s/p:      Procedure(s) (LRB):  REMOVAL, PENILE PROSTHESIS, INFLATABLE (N/A)  REPLACEMENT, PENILE PROSTHESIS, INFLATABLE (N/A)     Indwelling Lines/Drains at time of discharge:   Lines/Drains/Airways     Drain                 Urethral Catheter 12/11/18 1104 16 Fr. less than 1 day                Hospital Course (synopsis of major diagnoses, care, treatment, and services provided during the course of the hospital stay):     Patient was admitted on 12/11/2018 for above procedure.  Patient tolerated the procedure well, hospital course was uncomplicated, and patient was discharged home in good condition with pain well controlled on 12/12/2018  after la catheter was removed and IPP remained deactivated, patient ambulated, voided, tolerated regular diet, and received his AM dose of IV antibiotics.  On examination, he was in no distress.  He appeared well, his respirations were unlabored, heart rate regular, abdomen soft and non-tender, penoscrotal incision c/d/i with minimal ecchymosis around the operative site.  His la catheter had been draining clear yellow urine prior to removal.        Consults:     Significant Diagnostic Studies:     Pending Diagnostic Studies:     None          Final Active Diagnoses:    Diagnosis Date Noted POA    PRINCIPAL PROBLEM:  ED (erectile dysfunction) [N52.9] 02/11/2013 Yes    Erectile dysfunction [N52.9] 12/11/2018 Yes    Prostate cancer [C61] 02/11/2013 Yes    HTN (hypertension) [I10] 02/11/2013 Yes    High cholesterol [E78.00] 02/11/2013 Yes      Problems Resolved During this Admission:       Discharged Condition:  good    Disposition: Home or Self Care    Follow Up:  Follow-up Information     Soy Funez MD In 2 weeks.    Specialty:  Urology  Why:  post op IPP replacement   Contact information:  Jaylyn Moreno  [facility] [id number] [phone]                 Patient Instructions:      Call MD for:  persistent nausea and vomiting or diarrhea     Call MD for:  severe uncontrolled pain     Call MD for:  redness, tenderness, or signs of infection (pain, swelling, redness, odor or green/yellow discharge around incision site)     Call MD for:  difficulty breathing or increased cough     Call MD for:  severe persistent headache     Call MD for:  worsening rash     Call MD for:  persistent dizziness, light-headedness, or visual disturbances     Call MD for:  increased confusion or weakness     No dressing needed     Other restrictions (specify):   Order Comments: Postop instructions for IPP    Expect some bruising and swelling around scrotum for the next few days    No sex or masturbation x 6 weeks    You will be sent home with antibiotics x 4 weeks    Wear tight fitting underwear, jock strap or boxers.   When you are in bed or in a chair, you can elevate your scrotum.  Ice to scrotum 30 min on and 30 min off for 3-5 days post-op    Ok to shower in 2 days  No baths or soaking baths  No sitting in standing water until seen by your physician and given permission    Do not restart any blood thinners for the first few days unless told by your cardiologist or your physician specifically, if you have any questions call your physician    Return to ER if:  -you have fever  -see discharge from incisions or penis  -inability to void  -severe pain not controlled with pain meds  -any redness or concern for infection of your pump    Call your physician or urology clinic with any concerns     Medications:  Reconciled Home Medications:      Medication List      START taking these medications    dicloxacillin 250 MG capsule  Commonly  known as:  DYNAPEN  Take 1 capsule (250 mg total) by mouth 4 (four) times daily.     oxyCODONE-acetaminophen 5-325 mg per tablet  Commonly known as:  PERCOCET  Take 1-2 tablets by mouth every 4 (four) hours as needed.     polyethylene glycol 17 gram Pwpk  Commonly known as:  GLYCOLAX  Take 17 g by mouth once daily.        CONTINUE taking these medications    aspirin 325 MG tablet  Take 325 mg by mouth once daily.     buPROPion 300 MG 24 hr tablet  Commonly known as:  WELLBUTRIN XL     ezetimibe 10 mg tablet  Commonly known as:  ZETIA  Take 10 mg by mouth once daily.     lisinopril 10 MG tablet     loratadine 10 mg tablet  Commonly known as:  CLARITIN  Take 10 mg by mouth once daily.     multivitamin capsule  Take 1 capsule by mouth once daily.     tiZANidine 2 MG tablet  Commonly known as:  ZANAFLEX     traZODone 150 MG tablet  Commonly known as:  DESYREL  nightly.            Time spent on the discharge of patient: 30 minutes    Michael Miramontes MD  Urology  [facility]

## 2018-12-12 NOTE — PLAN OF CARE
Problem: Adult Inpatient Plan of Care  Goal: Plan of Care Review  Outcome: Ongoing (interventions implemented as appropriate)  Patient AAOX4. VSS. Lara care provided. Skin intact. No falls noted. Fall precautions remain. Patient instructed to call for assistance with getting up. Pain assessment,  Patient comfortable. Frequent rounds made for safety and patient care. Call light within reach, SCD's in place, wheels locked, bed in low position, side rails up x2, safety maintained. NADN, will continue to monitor.

## 2019-01-03 ENCOUNTER — OFFICE VISIT (OUTPATIENT)
Dept: UROLOGY | Facility: CLINIC | Age: 69
End: 2019-01-03
Payer: MEDICARE

## 2019-01-03 VITALS
BODY MASS INDEX: 30.75 KG/M2 | DIASTOLIC BLOOD PRESSURE: 87 MMHG | HEIGHT: 72 IN | HEART RATE: 83 BPM | WEIGHT: 227.06 LBS | SYSTOLIC BLOOD PRESSURE: 146 MMHG

## 2019-01-03 DIAGNOSIS — N52.31 ERECTILE DYSFUNCTION AFTER RADICAL PROSTATECTOMY: Primary | ICD-10-CM

## 2019-01-03 PROBLEM — N52.9 ERECTILE DYSFUNCTION: Status: RESOLVED | Noted: 2018-12-11 | Resolved: 2019-01-03

## 2019-01-03 PROCEDURE — 99999 PR PBB SHADOW E&M-EST. PATIENT-LVL III: ICD-10-PCS | Mod: PBBFAC,,, | Performed by: UROLOGY

## 2019-01-03 PROCEDURE — 99024 POSTOP FOLLOW-UP VISIT: CPT | Mod: S$GLB,,, | Performed by: UROLOGY

## 2019-01-03 PROCEDURE — 99024 PR POST-OP FOLLOW-UP VISIT: ICD-10-PCS | Mod: S$GLB,,, | Performed by: UROLOGY

## 2019-01-03 PROCEDURE — 99999 PR PBB SHADOW E&M-EST. PATIENT-LVL III: CPT | Mod: PBBFAC,,, | Performed by: UROLOGY

## 2019-01-03 RX ORDER — IBUPROFEN 200 MG
200 TABLET ORAL EVERY 6 HOURS PRN
COMMUNITY

## 2019-01-03 NOTE — PROGRESS NOTES
CHIEF COMPLAINT:    Mr. Parmar is a 68 y.o. male presenting with ED.    PRESENTING ILLNESS:    Galileo Parmar is a 68 y.o. male male who c/o ED.  He is s/p a RALP by Dr. Valentine in 2009.  His cancer was found after a TURP.  He underwent placement of a 3 piece AMS IPP on 6/4/13.  He underwent explant/reimplant on 12/11/18 due to device failure.    No bothersome LUTS.  No hematuria. No dysuria.    REVIEW OF SYSTEMS:    Galileo Parmar denies headache, blurred vision, fever, nausea, vomiting, chills, abdominal pain, bleeding per rectum, cough, SOB, recent loss of consciousness, recent mental status changes, seizures, dizziness, or upper or lower extremity weakness.    ALESHA  1. 1  2. 0  3. 0  4. 0  5. 0      PATIENT HISTORY:    Past Medical History:   Diagnosis Date    Allergy     Elevated PSA     Hypertension     Prostate cancer     Skin cancer     Urinary tract infection        Past Surgical History:   Procedure Laterality Date    CYSTOSCOPY      HERNIA REPAIR      double    INSERTION, PENILE PROSTHESIS, INFLATABLE N/A 6/4/2013    Performed by oSy Funez MD at [facility] OR 2ND FLR    PROSTATE SURGERY      2009 with turp x2    REMOVAL, PENILE PROSTHESIS, INFLATABLE N/A 12/11/2018    Performed by Soy Funez MD at [facility] OR 2ND FLR    REPLACEMENT, PENILE PROSTHESIS, INFLATABLE N/A 12/11/2018    Performed by Soy [facility]unez MD at [facility] OR 2ND FLR    Robotic prostatectomy      SKIN CANCER EXCISION      TONSILLECTOMY, ADENOIDECTOMY      VASECTOMY         Family History   Problem Relation Age of Onset    Prostate cancer Father     Cancer Mother     Cancer Sister        Social History     Socioeconomic History    Marital status:      Spouse name: Not on file    Number of children: Not on file    Years of education: Not on file    Highest education level: Not on file   Social Needs    Financial resource strain: Not on file    Food insecurity - worry: Not on file    Food  insecurity - inability: Not on file    Transportation needs - medical: Not on file    Transportation needs - non-medical: Not on file   Occupational History    Not on file   Tobacco Use    Smoking status: Former Smoker     Packs/day: 1.00     Years: 10.00     Pack years: 10.00     Types: Cigarettes    Smokeless tobacco: Never Used   Substance and Sexual Activity    Alcohol use: Yes     Comment: 28 oz week    Drug use: No    Sexual activity: Yes     Partners: Female   Other Topics Concern    Not on file   Social History Narrative    Not on file       Allergies:  Sulfa (sulfonamide antibiotics)    Medications:    Current Outpatient Medications:     aspirin 325 MG tablet, Take 325 mg by mouth once daily., Disp: , Rfl:     buPROPion (WELLBUTRIN XL) 300 MG 24 hr tablet, , Disp: , Rfl:     dicloxacillin (DYNAPEN) 250 MG capsule, Take 1 capsule (250 mg total) by mouth 4 (four) times daily., Disp: 120 capsule, Rfl: 0    ezetimibe (ZETIA) 10 mg tablet, Take 10 mg by mouth once daily., Disp: , Rfl:     FLUZONE HIGH-DOSE 2018-19, PF, 180 mcg/0.5 mL vaccine, , Disp: , Rfl:     ibuprofen (ADVIL,MOTRIN) 200 MG tablet, Take 200 mg by mouth every 6 (six) hours as needed for Pain., Disp: , Rfl:     lisinopril 10 MG tablet, , Disp: , Rfl:     loratadine (CLARITIN) 10 mg tablet, Take 10 mg by mouth once daily., Disp: , Rfl:     multivitamin capsule, Take 1 capsule by mouth once daily., Disp: , Rfl:     polyethylene glycol (GLYCOLAX) 17 gram PwPk, Take 17 g by mouth once daily., Disp: 30 packet, Rfl: 0    tiZANidine (ZANAFLEX) 2 MG tablet, , Disp: , Rfl:     trazodone (DESYREL) 150 MG tablet, nightly. , Disp: , Rfl:     PHYSICAL EXAMINATION:    The patient generally appears in good health, is appropriately interactive, and is in no apparent distress.     Eyes: anicteric sclerae, moist conjunctivae; no lid-lag; PERRLA     HENT: Atraumatic; oropharynx clear with moist mucous membranes and no mucosal  ulcerations;normal hard and soft palate.  No evidence of lymphadenopathy.    Neck: Trachea midline.  No thyromegaly.    Musculoskeletal: No abnormal gait.    Skin: No lesions.    Mental: Cooperative with normal affect.  Is oriented to time, place, and person.    Neuro: Grossly intact.    Chest: Normal inspiratory effort.   No accessory muscles.  No audible wheezes.  Respirations symmetric on inspiration and expiration.    Heart: Regular rhythm.      Abdomen:  Soft, non-tender. No masses or organomegaly. Bladder is not palpable. No evidence of flank discomfort. No evidence of inguinal hernia.    Genitourinary: The penis is circumcised with no evidence of plaques or induration. The urethral meatus is normal. The testes, epididymides, and cord structures are normal in size and contour bilaterally. The scrotum is normal in size and contour.  The IPP components are palpable in the penis and scrotum.    Extremities: No clubbing, cyanosis, or edema      LABS:    UA dipped negative today  No results found for: PSA, PSADIAG, PSATOTAL, PSAFREE, PSAFREEPCT    IMPRESSION:    Encounter Diagnoses   Name Primary?    Erectile dysfunction after radical prostatectomy Yes   HTN, controlled  Hyperlipidemia, controlled      PLAN:    1.Discussed that the pump is more posterior and higher than I like it to be as it migrated due to some of his edema.  However, it should be functional.  2. RTC 4 weeks to inflate/deflate with an LIT.  3. RTC to see me prn as he is in [place].    Copy to:

## 2019-02-06 ENCOUNTER — OFFICE VISIT (OUTPATIENT)
Dept: UROLOGY | Facility: CLINIC | Age: 69
End: 2019-02-06
Payer: MEDICARE

## 2019-02-06 VITALS
WEIGHT: 224.88 LBS | BODY MASS INDEX: 30.5 KG/M2 | HEART RATE: 87 BPM | DIASTOLIC BLOOD PRESSURE: 89 MMHG | SYSTOLIC BLOOD PRESSURE: 148 MMHG

## 2019-02-06 DIAGNOSIS — Z98.890 POST-OPERATIVE STATE: Primary | ICD-10-CM

## 2019-02-06 PROCEDURE — 99999 PR PBB SHADOW E&M-EST. PATIENT-LVL III: ICD-10-PCS | Mod: PBBFAC,,, | Performed by: NURSE PRACTITIONER

## 2019-02-06 PROCEDURE — 99024 POSTOP FOLLOW-UP VISIT: CPT | Mod: S$GLB,,, | Performed by: NURSE PRACTITIONER

## 2019-02-06 PROCEDURE — 99024 PR POST-OP FOLLOW-UP VISIT: ICD-10-PCS | Mod: S$GLB,,, | Performed by: NURSE PRACTITIONER

## 2019-02-06 PROCEDURE — 99999 PR PBB SHADOW E&M-EST. PATIENT-LVL III: CPT | Mod: PBBFAC,,, | Performed by: NURSE PRACTITIONER

## 2019-02-06 NOTE — PROGRESS NOTES
Subjective:       Patient ID: Galileo Parmar is a 68 y.o. male.    Chief Complaint: Post-op Evaluation    Galileo Parmar is a 68 y.o. male male who c/o ED.    He is s/p a RALP by Dr. Valentine in 2009.  His cancer was found after a TURP.    He underwent placement of a 3 piece AMS IPP on 6/4/13.    He underwent explant/reimplant on 12/11/18 due to device failure.  He saw Dr. Funez 01/03/2019    Here today for initial cycling.  States everything feel fine.  No issues with healing.              Past Medical History:  No date: Allergy  No date: Elevated PSA  No date: Hypertension  No date: Prostate cancer  No date: Skin cancer  No date: Urinary tract infection    Past Surgical History:  No date: CYSTOSCOPY  No date: HERNIA REPAIR      Comment:  double  6/4/2013: INSERTION, PENILE PROSTHESIS, INFLATABLE; N/A      Comment:  Performed by Soy Funez MD at [facility] OR [address] [place] [address]  No date: PROSTATE SURGERY      Comment:  2009 with turp x2  12/11/2018: REMOVAL, PENILE PROSTHESIS, INFLATABLE; N/A      Comment:  Performed by Soy Funez MD at [facility] OR [address] [place] [address]  12/11/2018: REPLACEMENT, PENILE PROSTHESIS, INFLATABLE; N/A      Comment:  Performed by Soy Funez MD at [facility] OR [address] [place] [address]  No date: Robotic prostatectomy  No date: SKIN CANCER EXCISION  No date: TONSILLECTOMY, ADENOIDECTOMY  No date: VASECTOMY    Review of patient's family history indicates:  Problem: Prostate cancer      Relation: Father          Age of Onset: (Not Specified)  Problem: Cancer      Relation: Mother          Age of Onset: (Not Specified)  Problem: Cancer      Relation: Sister          Age of Onset: (Not Specified)      Social History    Socioeconomic History      Marital status:       Spouse name: Not on file      Number of children: Not on file      Years of education: Not on file      Highest education level: Not on file    Social Needs      Financial resource strain: Not on file      Food insecurity - worry: Not on file       Food insecurity - inability: Not on file      Transportation needs - medical: Not on file      Transportation needs - non-medical: Not on file    Occupational History      Not on file    Tobacco Use      Smoking status: Former Smoker        Packs/day: 1.00        Years: 10.00        Pack years: 10        Types: Cigarettes      Smokeless tobacco: Never Used    Substance and Sexual Activity      Alcohol use: Yes        Comment: 28 oz week      Drug use: No      Sexual activity: Yes        Partners: Female    Other Topics      Concerns:        Not on file    Social History Narrative      Not on file      Allergies:  Sulfa (sulfonamide antibiotics)    Medications:  Current Outpatient Medications:   aspirin 325 MG tablet, Take 325 mg by mouth once daily., Disp: , Rfl:   buPROPion (WELLBUTRIN XL) 300 MG 24 hr tablet, , Disp: , Rfl:   ezetimibe (ZETIA) 10 mg tablet, Take 10 mg by mouth once daily., Disp: , Rfl:    FLUZONE HIGH-DOSE 2018-19, PF, 180 mcg/0.5 mL vaccine, , Disp: , Rfl:   ibuprofen (ADVIL,MOTRIN) 200 MG tablet, Take 200 mg by mouth every 6 (six) hours as needed for Pain., Disp: , Rfl:   lisinopril 10 MG tablet, , Disp: , Rfl:   loratadine (CLARITIN) 10 mg tablet, Take 10 mg by mouth once daily., Disp: , Rfl:   multivitamin capsule, Take 1 capsule by mouth once daily., Disp: , Rfl:   polyethylene glycol (GLYCOLAX) 17 gram PwPk, Take 17 g by mouth once daily., Disp: 30 packet, Rfl: 0  tiZANidine (ZANAFLEX) 2 MG tablet, , Disp: , Rfl:   trazodone (DESYREL) 150 MG tablet, nightly. , Disp: , Rfl:            Review of Systems   Constitutional: Negative for activity change, appetite change, chills and fever.   HENT: Negative for facial swelling and trouble swallowing.    Eyes: Negative for visual disturbance.   Respiratory: Negative for chest tightness and shortness of breath.    Cardiovascular: Negative for chest pain and palpitations.   Gastrointestinal: Negative.  Negative for abdominal pain,  constipation, diarrhea, nausea and vomiting.   Genitourinary: Negative for difficulty urinating, dysuria, flank pain, hematuria, penile pain, penile swelling, scrotal swelling and testicular pain.          No bothersome LUTS.  No hematuria. No dysuria.     Musculoskeletal: Negative for back pain, gait problem, myalgias and neck stiffness.   Skin: Negative for rash.   Neurological: Negative for dizziness and speech difficulty.   Hematological: Does not bruise/bleed easily.   Psychiatric/Behavioral: Negative for behavioral problems.       Objective:      Physical Exam   Nursing note and vitals reviewed.  Constitutional: He is oriented to person, place, and time. Vital signs are normal. He appears well-developed and well-nourished. He is active and cooperative.  Non-toxic appearance. He does not have a sickly appearance.   HENT:   Head: Normocephalic and atraumatic.   Right Ear: External ear normal.   Left Ear: External ear normal.   Nose: Nose normal.   Mouth/Throat: Mucous membranes are normal.   Eyes: Conjunctivae and lids are normal. No scleral icterus.   Neck: Trachea normal, normal range of motion and full passive range of motion without pain. Neck supple. No JVD present. No tracheal deviation present.   Cardiovascular: Normal rate, S1 normal and S2 normal.    Pulmonary/Chest: Effort normal. No respiratory distress. He exhibits no tenderness.   Abdominal: Soft. Normal appearance and bowel sounds are normal. There is no hepatosplenomegaly. There is no tenderness. There is no CVA tenderness.   Genitourinary: Testes normal and penis normal. Circumcised. No penile tenderness.   Genitourinary Comments: IPP contents easily palpable and nontender.     Musculoskeletal: Normal range of motion.   Neurological: He is alert and oriented to person, place, and time. He has normal strength.   Skin: Skin is warm, dry and intact.     Psychiatric: He has a normal mood and affect. His behavior is normal. Judgment and thought  content normal.       Assessment:       1. Post-operative state        Plan:         I spent 30 minutes with the patient of which more than half was spent in direct consultation with the patient in regards to our treatment and plan.    Education and recommendations of today's plan of care including home remedies.  We discussed his IPP and post op expectations.  He was able to identify all the components.  Initially very hard to inflate. Bulb would collapse then slowly re-inflate; but very slowly inflate the IPP.  After multiple attempts felt the ball valve pop open.  It was able to inflate/deflate with ease.  He cycled his IPP several times without difficulty.  Discussed he needs to inflate every day at the very least. More inflates, the easier the use.  RTC PRN.   Call with any problems

## (undated) DEVICE — SPONGE DERMACEA GAUZE 4X4

## (undated) DEVICE — SUT ETHILON 2-0 PSLX 30IN

## (undated) DEVICE — SUT 2-0 VICRYL / SH (J417)

## (undated) DEVICE — CLIPPER BLADE MOD 4406 (CAREF)

## (undated) DEVICE — SEE MEDLINE ITEM 154981

## (undated) DEVICE — DRAPE STERI INSTRUMENT 1018

## (undated) DEVICE — SYR 30CC LUER LOCK

## (undated) DEVICE — SEE MEDLINE ITEM 152529

## (undated) DEVICE — LUBRICANT SURGILUBE 2 OZ

## (undated) DEVICE — ADHESIVE DERMABOND ADVANCED

## (undated) DEVICE — BRIEF MESH LARGE

## (undated) DEVICE — BLADE SURG #15 CARBON STEEL

## (undated) DEVICE — DRAPE INCISE IOBAN 2 23X17IN

## (undated) DEVICE — SEE MEDLINE ITEM 146347

## (undated) DEVICE — KIT IRR SUCTION HND PIECE

## (undated) DEVICE — ELECTRODE REM PLYHSV RETURN 9

## (undated) DEVICE — SEE MEDLINE ITEM 152487

## (undated) DEVICE — SYR 50CC LL

## (undated) DEVICE — PANTIES FEMININE NAPKIN LG/XLG

## (undated) DEVICE — SUT CTD VICRYL VIL BR SH 27

## (undated) DEVICE — RETRACTOR STAY SPIRA  20MM

## (undated) DEVICE — SEE MEDLINE ITEM 157148

## (undated) DEVICE — CLOSURE SKIN STERI STRIP 1/2X4

## (undated) DEVICE — TRAY MINOR GEN SURG

## (undated) DEVICE — GAUZE SPONGE 4X4 12PLY

## (undated) DEVICE — TRAY FOLEY 16FR INFECTION CONT

## (undated) DEVICE — TIP REAR 2.0CM

## (undated) DEVICE — SET DECANTER MEDICHOICE

## (undated) DEVICE — CUP MEDICINE STERILE 2OZ

## (undated) DEVICE — SYR 10CC LUER LOCK

## (undated) DEVICE — DRESSING XEROFORM FOIL PK 1X8

## (undated) DEVICE — SEE MEDLINE ITEM 146417

## (undated) DEVICE — PACK SCROTO-PAK LONE STAR

## (undated) DEVICE — GAUZE FLUFF XXLG 36X36 2 PLY

## (undated) DEVICE — RETRACTOR LONE STAR 28.3X18.3

## (undated) DEVICE — Device

## (undated) DEVICE — SOL NS 1000CC